# Patient Record
Sex: FEMALE | NOT HISPANIC OR LATINO | Employment: FULL TIME | URBAN - METROPOLITAN AREA
[De-identification: names, ages, dates, MRNs, and addresses within clinical notes are randomized per-mention and may not be internally consistent; named-entity substitution may affect disease eponyms.]

---

## 2018-06-29 ENCOUNTER — NON-PROVIDER VISIT (OUTPATIENT)
Dept: OBGYN | Facility: CLINIC | Age: 28
End: 2018-06-29

## 2018-06-29 DIAGNOSIS — Z32.00 ENCOUNTER FOR PREGNANCY TEST, RESULT UNKNOWN: ICD-10-CM

## 2018-06-29 LAB
INT CON NEG: NEGATIVE
INT CON POS: POSITIVE
POC URINE PREGNANCY TEST: NEGATIVE

## 2018-06-29 PROCEDURE — 81025 URINE PREGNANCY TEST: CPT | Performed by: OBSTETRICS & GYNECOLOGY

## 2019-01-05 ENCOUNTER — HOSPITAL ENCOUNTER (EMERGENCY)
Facility: MEDICAL CENTER | Age: 29
End: 2019-01-05
Attending: EMERGENCY MEDICINE

## 2019-01-05 VITALS
OXYGEN SATURATION: 98 % | HEIGHT: 67 IN | WEIGHT: 119.71 LBS | SYSTOLIC BLOOD PRESSURE: 113 MMHG | DIASTOLIC BLOOD PRESSURE: 67 MMHG | BODY MASS INDEX: 18.79 KG/M2 | TEMPERATURE: 98.9 F | HEART RATE: 76 BPM | RESPIRATION RATE: 16 BRPM

## 2019-01-05 DIAGNOSIS — K08.89 PAIN, DENTAL: ICD-10-CM

## 2019-01-05 DIAGNOSIS — R42 DIZZINESS: ICD-10-CM

## 2019-01-05 DIAGNOSIS — F41.9 ANXIETY: ICD-10-CM

## 2019-01-05 LAB
APPEARANCE UR: CLEAR
APPEARANCE UR: CLEAR
BACTERIA #/AREA URNS HPF: ABNORMAL /HPF
BILIRUB UR QL STRIP.AUTO: NEGATIVE
COLOR UR AUTO: YELLOW
COLOR UR: YELLOW
EPI CELLS #/AREA URNS HPF: ABNORMAL /HPF
GLUCOSE UR QL STRIP.AUTO: NEGATIVE MG/DL
GLUCOSE UR STRIP.AUTO-MCNC: NEGATIVE MG/DL
HCG UR QL: NEGATIVE
HYALINE CASTS #/AREA URNS LPF: ABNORMAL /LPF
KETONES UR QL STRIP.AUTO: NEGATIVE MG/DL
KETONES UR STRIP.AUTO-MCNC: NEGATIVE MG/DL
LEUKOCYTE ESTERASE UR QL STRIP.AUTO: ABNORMAL
LEUKOCYTE ESTERASE UR QL STRIP.AUTO: ABNORMAL
MICRO URNS: ABNORMAL
MUCOUS THREADS #/AREA URNS HPF: ABNORMAL /HPF
NITRITE UR QL STRIP.AUTO: NEGATIVE
NITRITE UR QL STRIP.AUTO: NEGATIVE
PH UR STRIP.AUTO: 5.5 [PH]
PH UR STRIP.AUTO: 6 [PH]
PROT UR QL STRIP: NEGATIVE MG/DL
PROT UR QL STRIP: NEGATIVE MG/DL
RBC # URNS HPF: ABNORMAL /HPF
RBC UR QL AUTO: NEGATIVE
RBC UR QL AUTO: NEGATIVE
SP GR UR STRIP.AUTO: 1.03
SP GR UR: >=1.03
UROBILINOGEN UR STRIP.AUTO-MCNC: 0.2 MG/DL
WBC #/AREA URNS HPF: ABNORMAL /HPF

## 2019-01-05 PROCEDURE — A9270 NON-COVERED ITEM OR SERVICE: HCPCS | Performed by: EMERGENCY MEDICINE

## 2019-01-05 PROCEDURE — 81001 URINALYSIS AUTO W/SCOPE: CPT

## 2019-01-05 PROCEDURE — 700102 HCHG RX REV CODE 250 W/ 637 OVERRIDE(OP): Performed by: EMERGENCY MEDICINE

## 2019-01-05 PROCEDURE — 81002 URINALYSIS NONAUTO W/O SCOPE: CPT

## 2019-01-05 PROCEDURE — 99285 EMERGENCY DEPT VISIT HI MDM: CPT

## 2019-01-05 PROCEDURE — 81025 URINE PREGNANCY TEST: CPT

## 2019-01-05 RX ORDER — MECLIZINE HYDROCHLORIDE 25 MG/1
25 TABLET ORAL ONCE
Status: COMPLETED | OUTPATIENT
Start: 2019-01-05 | End: 2019-01-05

## 2019-01-05 RX ADMIN — MECLIZINE HYDROCHLORIDE 25 MG: 25 TABLET ORAL at 15:01

## 2019-01-05 ASSESSMENT — PAIN SCALES - GENERAL: PAINLEVEL_OUTOF10: 9

## 2019-01-05 NOTE — ED TRIAGE NOTES
"Rosamaria Castro  Chief Complaint   Patient presents with   • Weakness   • Dental Pain   • Shortness of Breath     Pt w/c to triage with above complaint. Pt states she began to notice s/s approx 1 hour ago. Pt states she noticed a tooth ache to back LEFT molar. Pt then became weak and unsteady upon standing or ambulation. Pt afebrile in triage. Pt also c/o SOB. Pt able to speak full sentenced in triage.     /77   Pulse 62   Temp 36.3 °C (97.4 °F) (Temporal)   Resp 16   Ht 1.702 m (5' 7\")   Wt 54.3 kg (119 lb 11.4 oz)   SpO2 99%   BMI 18.75 kg/m²     Pt informed of triage process and encouraged to notify staff of any changes or concerns. Pt verbalized understanding of instructions. Pt placed back in lobby.     "

## 2019-01-05 NOTE — ED NOTES
"Pt making poor eye contact upon assessment, will not open eyes, talking quickly and anxiously. She reports she has a known broken back left molar that began hurting today. Pt then became weak and unsteady and had SOB and \"whole body tingling\".   "

## 2019-01-05 NOTE — ED NOTES
Medicated per MAR, pt now has eyes open and doesn't appear as anxious. Pt given PO fluids per EDP's order.

## 2019-01-05 NOTE — ED PROVIDER NOTES
"ED Provider Note    Scribed for Leighton Bean M.D. by Satinder Simms. 1/5/2019  2:53 PM    Primary care provider: Pcp Pt States None  Means of arrival: Walk In  History obtained from: Patient  History limited by: None    CHIEF COMPLAINT  Chief Complaint   Patient presents with   • Weakness   • Dental Pain   • Shortness of Breath       HPI  Rosamaria Castro is a 28 y.o. female who presents for evaluation of dizziness described as lightheadedness following an episode of dental pain just prior to arrival. Per patient she had a toothache in her back bottom left molar and rated the pain at a 9/10, when after a short while her pain \"burst\" causing her to stand up, at which time she felt nauseous and lightheaded. She no longer complains of tooth pain, and denies any drainage from the tooth, however still feels lightheaded and somewhat weak. She denies any dysuria or chance of pregnancy and has no history of vertigo or other medical problems.      REVIEW OF SYSTEMS  Pertinent positives include: toothache (Resolved), lightheadedness, nausea, weakness.  Pertinent negatives include: dysuria.    PAST MEDICAL HISTORY  Denies    SOCIAL HISTORY    Social History Main Topics   • Smoking status: Never Smoker   • Smokeless tobacco: Never Used   • Alcohol use No   • Drug use: Yes     Types: Inhaled      Comment: marijuana   • Sexual activity: None noted         CURRENT MEDICATIONS  Home Medications     Reviewed by Michele Carey R.N. (Registered Nurse) on 01/05/19 at 1420  Med List Status: Complete   Medication Last Dose Status        Patient Abraham Taking any Medications                       ALLERGIES  Allergies no known allergies    PHYSICAL EXAM  VITAL SIGNS: /77   Pulse 62   Temp 36.3 °C (97.4 °F) (Temporal)   Resp 16   Ht 1.702 m (5' 7\")   Wt 54.3 kg (119 lb 11.4 oz)   SpO2 99%   BMI 18.75 kg/m²  Reviewed and afebrile, no hypoxia room air  Constitutional :  Well developed, Well nourished, 28 year old female sitting " on bed.  Patient is keeping her eyes closed due to dizziness when she opens her eyes  HNT: Dental cavity present to the back left molar which reaches down to gumline no localized swelling or erythema.  No tooth tenderness.  No TMJ crepitus.  No trismus.  Ears: Normal external ears.  Tympanic membranes pearly with normal landmarks  Eyes: pupils reactive without eye discharge nor conjunctival hyperemia.  Neck: Normal range of motion, No tenderness, Supple, No stridor.   Lymphatic: No lymphadenopathy identified.   Cardiovascular: Regular rhythm, No murmurs, No rubs, No gallops.  No cyanosis.   Respiratory: Unlabored breathing with no wheezing no definite hyperventilation  Abdomen:  Soft, nontender  Skin: Warm, dry, no erythema, no rash.   Musculoskeletal: no limb deformities.  Neurological: Cranial nerves II-XII are intact, Grasp, biceps, extensor hallucis longus, ankle plantar flexion are 5/5 and symmetric, Sensation is intact to light touch in all 4 limbs.  No focal deficits noted.  Finger-nose-finger and fine motor without dysmetria    LABORATORY:  Results for orders placed or performed during the hospital encounter of 01/05/19   URINALYSIS,CULTURE IF INDICATED   Result Value Ref Range    Color Yellow     Character Clear     Specific Gravity 1.026 <1.035    Ph 5.5 5.0 - 8.0    Glucose Negative Negative mg/dL    Ketones Negative Negative mg/dL    Protein Negative Negative mg/dL    Bilirubin Negative Negative    Urobilinogen, Urine 0.2 Negative    Nitrite Negative Negative    Leukocyte Esterase Moderate (A) Negative    Occult Blood Negative Negative    Micro Urine Req Microscopic    URINE MICROSCOPIC (W/UA)   Result Value Ref Range    WBC 5-10 (A) /hpf    RBC 2-5 (A) /hpf    Bacteria Moderate (A) None /hpf    Epithelial Cells Moderate (A) /hpf    Mucous Threads Few /hpf    Hyaline Cast 0-2 /lpf   POC UA   Result Value Ref Range    POC Color Yellow     POC Appearance Clear     POC Glucose Negative Negative mg/dL    POC  Ketones Negative Negative mg/dL    POC Specific Gravity >=1.030 (A) 1.005 - 1.030    POC Blood Negative Negative    POC Urine PH 6.0 5.0 - 8.0    POC Protein Negative Negative mg/dL    POC Nitrites Negative Negative    POC Leukocyte Esterase Small (A) Negative   POC URINE PREGNANCY   Result Value Ref Range    POC Urine Pregnancy Test Negative Negative     Patient was orthostatic by pulse    INTERVENTIONS:  Medications   meclizine (ANTIVERT) tablet 25 mg (25 mg Oral Given 1/5/19 1501)   P.o. fluids    Response: Improved dizziness    ED COURSE:  2:53 PM - Patient seen and examined at bedside. Patient will be treated with Antivert 25 mg for her symptoms. Ordered POC Urinalysis, POC Urine Pregnancy to evaluate. Informed the patient I will check her urine for any underlying causes or concerns relating to her symptoms. She understands and is comfortable with the plan of care.    3:22 PM - Ordered Urine Microscopic w/UA, POC UA, Urinalysis culture if indicated    4:23 PM - Informed the patient that I am awaiting her urine microscopic lab to result to complete evaluation, however given her current results, symptoms and causes, she is likely dehydrated and instructed to drink plenty of fluids. I will await her labs to result to make sure a UTI is not missed, and will continue to monitor the patient at this time.    4:31 PM - Patients urine labs resulted which appeared reassuring and do not show signs of infection. Reemphasized the point of staying hydrated. She will be discharged at this time which she understands and is comfortable with.     MEDICAL DECISION MAKING:  Well-appearing patient presents with an episode of dizziness triggered by severe tooth pain which spontaneously resolved.  There is no definite spontaneously ruptured abscess.  Patient may have mild vertigo although this is more like mild dehydration with neurocardiogenic reflex and anxiety.    DISPOSITION:  Patient will be discharged home in stable  condition    PLAN:  Take amoxicillin only for persistent dental pain  Increase oral fluids 24 hours  Dental Pain and Dizziness handouts given  Return for syncope without warning, syncope with exercise, headache, chest pain, palpitations or leg swelling    [address] [address]  [phone]  Schedule an appointment as soon as possible for a visit   for new primary    Rolando Bonilla DMD, M.D.  [address]  [facility] [address]  [phone]    Schedule an appointment as soon as possible for a visit   or with one of the other dentists for treatment of the tooth      CONDITION:  Good.    FINAL IMPRESSION:  1. Dizziness    2. Pain, dental    3. Anxiety         ISatinder (Scribe), am scribing for, and in the presence of, Leighton Bean M.D..    Electronically signed by: Satinder Simms (Scribe), 1/5/2019    Leighton IVEY M.D. personally performed the services described in this documentation, as scribed by Satinder Simms in my presence, and it is both accurate and complete. E.    The note accurately reflects work and decisions made by me.  Leighton Bean  1/5/2019  4:59 PM

## 2019-01-06 NOTE — DISCHARGE INSTRUCTIONS
Increase your fluid intake over the next 24 hours.  Take antibiotic only if you develop persistent dental pain.  Follow-up with oral surgery Dr. Bonilla or 1 of the dentists on the handout below.  Return for loss of consciousness especially without warning, with exertion, with severe headache, with chest pain, with palpitations or with leg swelling.  At this point there is no evidence of any dangerous medical condition.    Dental Pain (Tooth Ache)    Start antibiotics if you develop persistent pain, fever or swelling of the teeth.  Take ibuprofen 600-800 mg every 6 hours or naproxen 500 mg every 8-12 hours for pain.    Return to [facility] for ill appearance, shortness of breath, difficulty swallowing or significant facial swelling.  Followup with your dentist, the on call oral surgeon Dr. Bonilla, the [facility] dentist, Dr. Tony Manriquez ([phone], [address] [place] [address]), or Dr. Tao Zhang ([phone]) in 4 days.    You had an elevated or high normal blood pressure reading today.  This does not necessarily mean you have hypertension.  Please followup with your/a primary physician for comprehensive blood pressure evaluation.  BP Readings from Last 3 Encounters:   01/05/19 103/67       You have been seen by your caregiver because of a tooth ache. This may be caused by cavities (tooth decay) which expose the nerve of the tooth to air and hot or cold temperatures, which cause pain. It may come from an infection or abscess (also called a boil or furuncle) around your tooth, also often caused by dental caries (tooth decay). This causes the pain you are having.    DIAGNOSIS (HOW DO YOU TELL WHAT IS WRONG)  Your caregiver can diagnose this problem often by exam.    TREATMENT  If caused by an infection it may be treated with antibiotics (medications which kill germs) and pain medications as prescribed by your caregiver. Take medications as directed.  You may take ibuprofen (Advil® or Motrin®), acetaminophen (Tylenol®), or both,  as needed for pain or temperature. Ibuprofen and acetaminophen may be taken together in their regular dosages.  Whether the tooth ache today is caused by infection or dental disease, it is advisable to see your dentist as soon as possible for further care.     CALL OR RETURN TO THIS LOCATION IF:  The exam and treatment you received today has been provided on an emergency basis only. This is not a substitute for complete medical or dental care. If your problem worsens or new symptoms (problems) appear, and you are unable to arrange prompt follow-up care with your dentist, call or return to this location.      [employer]® Patient Information ©2007 [employer], [employer].

## 2019-06-13 ENCOUNTER — HOSPITAL ENCOUNTER (EMERGENCY)
Facility: MEDICAL CENTER | Age: 29
End: 2019-06-13
Attending: EMERGENCY MEDICINE
Payer: MEDICAID

## 2019-06-13 VITALS
HEART RATE: 83 BPM | OXYGEN SATURATION: 98 % | BODY MASS INDEX: 17.3 KG/M2 | HEIGHT: 67 IN | SYSTOLIC BLOOD PRESSURE: 104 MMHG | DIASTOLIC BLOOD PRESSURE: 75 MMHG | WEIGHT: 110.23 LBS | RESPIRATION RATE: 18 BRPM | TEMPERATURE: 98.2 F

## 2019-06-13 DIAGNOSIS — J02.0 STREP PHARYNGITIS: ICD-10-CM

## 2019-06-13 LAB — S PYO DNA SPEC NAA+PROBE: DETECTED

## 2019-06-13 PROCEDURE — 87651 STREP A DNA AMP PROBE: CPT

## 2019-06-13 PROCEDURE — 99284 EMERGENCY DEPT VISIT MOD MDM: CPT

## 2019-06-13 PROCEDURE — 700102 HCHG RX REV CODE 250 W/ 637 OVERRIDE(OP): Performed by: EMERGENCY MEDICINE

## 2019-06-13 PROCEDURE — A9270 NON-COVERED ITEM OR SERVICE: HCPCS | Performed by: EMERGENCY MEDICINE

## 2019-06-13 PROCEDURE — 700111 HCHG RX REV CODE 636 W/ 250 OVERRIDE (IP): Performed by: EMERGENCY MEDICINE

## 2019-06-13 RX ORDER — DEXAMETHASONE SODIUM PHOSPHATE 10 MG/ML
10 INJECTION, SOLUTION INTRAMUSCULAR; INTRAVENOUS ONCE
Status: COMPLETED | OUTPATIENT
Start: 2019-06-13 | End: 2019-06-13

## 2019-06-13 RX ORDER — AMOXICILLIN 500 MG/1
1000 CAPSULE ORAL DAILY
Qty: 20 CAP | Refills: 0 | Status: SHIPPED | OUTPATIENT
Start: 2019-06-13 | End: 2019-06-14

## 2019-06-13 RX ORDER — AMOXICILLIN 500 MG/1
1000 CAPSULE ORAL ONCE
Status: COMPLETED | OUTPATIENT
Start: 2019-06-13 | End: 2019-06-13

## 2019-06-13 RX ADMIN — DEXAMETHASONE SODIUM PHOSPHATE 10 MG: 10 INJECTION INTRAMUSCULAR; INTRAVENOUS at 16:42

## 2019-06-13 RX ADMIN — AMOXICILLIN 1000 MG: 500 CAPSULE ORAL at 16:46

## 2019-06-13 NOTE — ED TRIAGE NOTES
Chief Complaint   Patient presents with   • Sore Throat     x3 days.      Ambulatory to triage for above. Denies congestion or cough. Denies fever. Explained triage process, to waiting room. Asked to inform RN if questions or concerns arise.

## 2019-06-13 NOTE — ED NOTES
from Lab called with critical result of +group strep at 1630. Critical lab result read back to .   Dr. Amezcua notified of critical lab result at 1630.  Critical lab result read back by Dr. Amezcua.

## 2019-06-13 NOTE — ED PROVIDER NOTES
"ED Provider Note    CHIEF COMPLAINT  Chief Complaint   Patient presents with   • Sore Throat     x3 days.        HPI  Rosamaria Wolfe is a 29 y.o. female who presents with a sore throat.  Started 3 days ago.  Gradually getting worse.  Severe pain with swallowing.  Even drinking liquids hurts, but she is able to get it down.  She has not felt feverish.  No cough congestion runny nose.  No known ill contacts.  No vomiting diarrhea or difficulty breathing, cough or other URI symptoms.    REVIEW OF SYSTEMS  Pertinent negative: As above    PAST MEDICAL HISTORY  Negative    SOCIAL HISTORY  Social History   Substance Use Topics   • Smoking status: Never Smoker   • Smokeless tobacco: Never Used   • Alcohol use No       SURGICAL HISTORY  History reviewed. No pertinent surgical history.    ALLERGIES  No Known Allergies    PHYSICAL EXAM  VITAL SIGNS: /69   Pulse (!) 116   Temp 36.6 °C (97.8 °F) (Temporal)   Resp 16   Ht 1.702 m (5' 7\")   Wt 50 kg (110 lb 3.7 oz)   SpO2 96%   BMI 17.26 kg/m²    Constitutional: Awake and alert. Nontoxic  HENT: Diffuse pharyngeal erythema with petechiae over the soft palate.  No asymmetry or uvular shift.  No abscess.  Eyes: Grossly normal  Neck: Normal range of motion  Cardiovascular: Normal heart rate   Thorax & Lungs: No respiratory distress  Abdomen: Nontender  Skin:  No pathologic rash.   Extremities: Well perfused  Psychiatric: Affect normal    Labs:  Results for orders placed or performed during the hospital encounter of 06/13/19   Group A Strep by PCR   Result Value Ref Range    Group A Strep by PCR DETECTED (A) Not Detected      COURSE & MEDICAL DECISION MAKING  Patient presents with tonsillitis.  Patient identified to have strep by PCR.  She is given oral Decadron in attempt for symptomatic relief.  She will be treated with antibiotics.  Given a prescription for amoxicillin 1 g daily for 10 days.  Stressed the importance of taking whole course of antibiotics.  Advised push " fluids.  Return to the ER for worsening, not improving, difficult he breathing or concern.      FINAL IMPRESSION  1.  Streptococcal pharyngitis/tonsillitis      Disposition: home in good condition      This dictation was created using voice recognition software. The accuracy of the dictation is limited to the abilities of the software.  The nursing notes were reviewed and certain aspects of this information were incorporated into this note.      Electronically signed by: Juan Amezcua, 6/13/2019 4:36 PM

## 2019-06-14 ENCOUNTER — HOSPITAL ENCOUNTER (EMERGENCY)
Facility: MEDICAL CENTER | Age: 29
End: 2019-06-14
Attending: EMERGENCY MEDICINE
Payer: MEDICAID

## 2019-06-14 VITALS
DIASTOLIC BLOOD PRESSURE: 61 MMHG | SYSTOLIC BLOOD PRESSURE: 118 MMHG | BODY MASS INDEX: 15.95 KG/M2 | RESPIRATION RATE: 26 BRPM | TEMPERATURE: 98.1 F | HEART RATE: 125 BPM | WEIGHT: 101.85 LBS | OXYGEN SATURATION: 98 %

## 2019-06-14 DIAGNOSIS — T30.0 FIRST DEGREE BURNS OF MULTIPLE SITES: ICD-10-CM

## 2019-06-14 PROCEDURE — A9270 NON-COVERED ITEM OR SERVICE: HCPCS | Performed by: EMERGENCY MEDICINE

## 2019-06-14 PROCEDURE — 99283 EMERGENCY DEPT VISIT LOW MDM: CPT

## 2019-06-14 PROCEDURE — 700111 HCHG RX REV CODE 636 W/ 250 OVERRIDE (IP): Performed by: EMERGENCY MEDICINE

## 2019-06-14 PROCEDURE — 700102 HCHG RX REV CODE 250 W/ 637 OVERRIDE(OP): Performed by: EMERGENCY MEDICINE

## 2019-06-14 RX ORDER — TRAMADOL HYDROCHLORIDE 50 MG/1
50 TABLET ORAL EVERY 6 HOURS PRN
Qty: 10 TAB | Refills: 0 | Status: SHIPPED | OUTPATIENT
Start: 2019-06-14 | End: 2019-06-16

## 2019-06-14 RX ORDER — AMOXICILLIN 500 MG/1
500 CAPSULE ORAL 3 TIMES DAILY
COMMUNITY

## 2019-06-14 RX ORDER — OXYCODONE HYDROCHLORIDE AND ACETAMINOPHEN 5; 325 MG/1; MG/1
1 TABLET ORAL ONCE
Status: COMPLETED | OUTPATIENT
Start: 2019-06-14 | End: 2019-06-14

## 2019-06-14 RX ORDER — ONDANSETRON 4 MG/1
4 TABLET, ORALLY DISINTEGRATING ORAL ONCE
Status: COMPLETED | OUTPATIENT
Start: 2019-06-14 | End: 2019-06-14

## 2019-06-14 RX ADMIN — OXYCODONE HYDROCHLORIDE AND ACETAMINOPHEN 1 TABLET: 5; 325 TABLET ORAL at 18:09

## 2019-06-14 RX ADMIN — ONDANSETRON 4 MG: 4 TABLET, ORALLY DISINTEGRATING ORAL at 18:09
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
## 2019-06-15 NOTE — ED NOTES
EN Jamil, discharged patient prior to leaving.  I am removing patient only to indicate patient's departure. I did not perform any of the discharge.
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
